# Patient Record
Sex: FEMALE
[De-identification: names, ages, dates, MRNs, and addresses within clinical notes are randomized per-mention and may not be internally consistent; named-entity substitution may affect disease eponyms.]

---

## 2024-07-25 PROBLEM — Z00.00 ENCOUNTER FOR PREVENTIVE HEALTH EXAMINATION: Status: ACTIVE | Noted: 2024-07-25

## 2024-07-26 ENCOUNTER — NON-APPOINTMENT (OUTPATIENT)
Age: 79
End: 2024-07-26

## 2024-07-26 ENCOUNTER — APPOINTMENT (OUTPATIENT)
Dept: HEART AND VASCULAR | Facility: CLINIC | Age: 79
End: 2024-07-26

## 2024-07-26 VITALS
OXYGEN SATURATION: 97 % | SYSTOLIC BLOOD PRESSURE: 152 MMHG | DIASTOLIC BLOOD PRESSURE: 83 MMHG | RESPIRATION RATE: 16 BRPM | WEIGHT: 151 LBS | HEART RATE: 72 BPM | TEMPERATURE: 98.2 F

## 2024-07-26 DIAGNOSIS — Z86.79 PERSONAL HISTORY OF OTHER DISEASES OF THE CIRCULATORY SYSTEM: ICD-10-CM

## 2024-07-26 DIAGNOSIS — I47.19 OTHER SUPRAVENTRICULAR TACHYCARDIA: ICD-10-CM

## 2024-07-26 NOTE — PHYSICAL EXAM
[Normal] : well developed, well nourished, no acute distress [Well Developed] : well developed [Well Nourished] : well nourished [No Acute Distress] : no acute distress [Normal Venous Pressure] : normal venous pressure [No Carotid Bruit] : no carotid bruit [Normal S1, S2] : normal S1, S2 [No Murmur] : no murmur [No Rub] : no rub [No Gallop] : no gallop [Clear Lung Fields] : clear lung fields [Good Air Entry] : good air entry [No Respiratory Distress] : no respiratory distress  [Soft] : abdomen soft [Non Tender] : non-tender [Normal Bowel Sounds] : normal bowel sounds [Normal Gait] : normal gait [No Edema] : no edema [No Cyanosis] : no cyanosis [No Clubbing] : no clubbing [No Varicosities] : no varicosities [No Rash] : no rash [No Skin Lesions] : no skin lesions [Moves all extremities] : moves all extremities [No Focal Deficits] : no focal deficits [Normal Speech] : normal speech [Alert and Oriented] : alert and oriented [Normal memory] : normal memory

## 2024-07-31 PROBLEM — I47.19 ATRIAL TACHYCARDIA: Status: ACTIVE | Noted: 2024-07-26

## 2024-07-31 PROBLEM — Z86.79 HISTORY OF HYPERTENSION: Status: RESOLVED | Noted: 2024-07-26 | Resolved: 2024-07-31

## 2024-07-31 NOTE — ASSESSMENT
[FreeTextEntry1] : We discussed the normal conduction system of the heart and what atrial tachycardia is. We discussed the natural progression of this arrhythmia. Treatment approaches for paroxysmal atrial tachycardia include observation / rate control and ablation. We discussed what an ablation is in detail including risks, benefits and alternatives.    Patient has sustained palpitations with AT documented on holter monitor. Not on oral anticoagulation, on PRN metoprolol tartrate. Discussed how medications will not be effective long term in managing atrial tachycardia.   Discussed risks, benefits and alternatives of ablation procedure. Discussed risks including but not limited to stroke, bleeding, infection, need for major cardiac surgery, death, phrenic nerve palsy/paralysis, occur in less than 1:1000 patients.    Patient expressed understanding will proceed to an ablation in early September 2024. She will withhold BB therapy fo 3 days prior to the procedure. We will obtain result of MCOT monitor completed at Dr. Martinez's [facility].

## 2024-07-31 NOTE — ASSESSMENT
[FreeTextEntry1] : We discussed the normal conduction system of the heart and what atrial tachycardia is. We discussed the natural progression of this arrhythmia. Treatment approaches for paroxysmal atrial tachycardia include observation / rate control and ablation. We discussed what an ablation is in detail including risks, benefits and alternatives.    Patient has sustained palpitations with AT documented on holter monitor. Not on oral anticoagulation, on PRN metoprolol tartrate. Discussed how medications will not be effective long term in managing atrial tachycardia.   Discussed risks, benefits and alternatives of ablation procedure. Discussed risks including but not limited to stroke, bleeding, infection, need for major cardiac surgery, death, phrenic nerve palsy/paralysis, occur in less than 1:1000 patients.    Patient expressed understanding will proceed to an ablation in early September 2024. She will withhold BB therapy fo 3 days prior to the procedure. We will obtain result of MCOT monitor completed at Dr. Martinez's [facility].

## 2024-07-31 NOTE — HISTORY OF PRESENT ILLNESS
[FreeTextEntry1] : Ms. Rivas is a 78 year old female with hypertension, hypothyroid presenting to the office for evaluation of atrial tachycardia.   Patient endorses palpitations a few times a week lasting couple of minutes. Palpitations began a month ago. She states it is usually precipitated by emotional stress. She endorses to associated symptoms of facial flushing, dizziness, hypotension and fatigue. She administers PRN doses of metoprolol tartrate for these episodes.  Review of a screenshot of MCOT from Dr. Martinez's office, shows a paroxysmal episode of Atrial Tachycardia.   Patient is retired. She does not drink or smoke cigarette.    Echocardiogram (June 12, 2024):  LVEF 65% trace MR, no other valvulopathy

## 2024-09-04 ENCOUNTER — INPATIENT (INPATIENT)
Facility: HOSPITAL | Age: 79
LOS: 0 days | Discharge: HOME | End: 2024-09-05
Attending: INTERNAL MEDICINE | Admitting: INTERNAL MEDICINE
Payer: MEDICARE

## 2024-09-04 VITALS
RESPIRATION RATE: 19 BRPM | HEART RATE: 78 BPM | SYSTOLIC BLOOD PRESSURE: 104 MMHG | OXYGEN SATURATION: 97 % | DIASTOLIC BLOOD PRESSURE: 58 MMHG

## 2024-09-04 LAB
ANION GAP SERPL CALC-SCNC: 9 MMOL/L — SIGNIFICANT CHANGE UP (ref 5–17)
APTT BLD: 34.5 SEC — SIGNIFICANT CHANGE UP (ref 24.5–35.6)
BLD GP AB SCN SERPL QL: NEGATIVE — SIGNIFICANT CHANGE UP
BUN SERPL-MCNC: 10 MG/DL — SIGNIFICANT CHANGE UP (ref 7–23)
CALCIUM SERPL-MCNC: 9.4 MG/DL — SIGNIFICANT CHANGE UP (ref 8.4–10.5)
CHLORIDE SERPL-SCNC: 104 MMOL/L — SIGNIFICANT CHANGE UP (ref 96–108)
CO2 SERPL-SCNC: 25 MMOL/L — SIGNIFICANT CHANGE UP (ref 22–31)
CREAT SERPL-MCNC: 0.72 MG/DL — SIGNIFICANT CHANGE UP (ref 0.5–1.3)
EGFR: 86 ML/MIN/1.73M2 — SIGNIFICANT CHANGE UP
GLUCOSE SERPL-MCNC: 102 MG/DL — HIGH (ref 70–99)
HCT VFR BLD CALC: 36.9 % — SIGNIFICANT CHANGE UP (ref 34.5–45)
HGB BLD-MCNC: 12 G/DL — SIGNIFICANT CHANGE UP (ref 11.5–15.5)
INR BLD: 0.92 — SIGNIFICANT CHANGE UP (ref 0.85–1.18)
ISTAT INR: 1 — SIGNIFICANT CHANGE UP (ref 0.88–1.16)
ISTAT PT: 10.4 SEC — SIGNIFICANT CHANGE UP (ref 10–12.9)
ISTAT VENOUS BE: 1 MMOL/L — SIGNIFICANT CHANGE UP (ref -2–3)
ISTAT VENOUS GLUCOSE: 101 MG/DL — HIGH (ref 70–99)
ISTAT VENOUS HCO3: 26 MMOL/L — SIGNIFICANT CHANGE UP (ref 23–28)
ISTAT VENOUS HEMATOCRIT: 37 % — SIGNIFICANT CHANGE UP (ref 34.5–45)
ISTAT VENOUS HEMOGLOBIN: 12.6 GM/DL — SIGNIFICANT CHANGE UP (ref 11.5–15.5)
ISTAT VENOUS IONIZED CALCIUM: 1.2 MMOL/L — SIGNIFICANT CHANGE UP (ref 1.12–1.3)
ISTAT VENOUS PCO2: 42 MMHG — SIGNIFICANT CHANGE UP (ref 41–51)
ISTAT VENOUS PH: 7.39 — SIGNIFICANT CHANGE UP (ref 7.31–7.41)
ISTAT VENOUS PO2: <66 MMHG — LOW (ref 35–40)
ISTAT VENOUS POTASSIUM: 4.2 MMOL/L — SIGNIFICANT CHANGE UP (ref 3.5–5.3)
ISTAT VENOUS SO2: 20 % — SIGNIFICANT CHANGE UP
ISTAT VENOUS SODIUM: 140 MMOL/L — SIGNIFICANT CHANGE UP (ref 135–145)
ISTAT VENOUS TCO2: 27 MMOL/L — SIGNIFICANT CHANGE UP (ref 22–31)
MAGNESIUM SERPL-MCNC: 1.9 MG/DL — SIGNIFICANT CHANGE UP (ref 1.6–2.6)
MCHC RBC-ENTMCNC: 30.2 PG — SIGNIFICANT CHANGE UP (ref 27–34)
MCHC RBC-ENTMCNC: 32.5 GM/DL — SIGNIFICANT CHANGE UP (ref 32–36)
MCV RBC AUTO: 92.7 FL — SIGNIFICANT CHANGE UP (ref 80–100)
NRBC # BLD: 0 /100 WBCS — SIGNIFICANT CHANGE UP (ref 0–0)
PLATELET # BLD AUTO: 301 K/UL — SIGNIFICANT CHANGE UP (ref 150–400)
POCT ISTAT CREATININE: 0.5 MG/DL — SIGNIFICANT CHANGE UP (ref 0.5–1.3)
POTASSIUM SERPL-MCNC: 3.9 MMOL/L — SIGNIFICANT CHANGE UP (ref 3.5–5.3)
POTASSIUM SERPL-SCNC: 3.9 MMOL/L — SIGNIFICANT CHANGE UP (ref 3.5–5.3)
PROTHROM AB SERPL-ACNC: 10.5 SEC — SIGNIFICANT CHANGE UP (ref 9.5–13)
RBC # BLD: 3.98 M/UL — SIGNIFICANT CHANGE UP (ref 3.8–5.2)
RBC # FLD: 14.2 % — SIGNIFICANT CHANGE UP (ref 10.3–14.5)
RH IG SCN BLD-IMP: POSITIVE — SIGNIFICANT CHANGE UP
SODIUM SERPL-SCNC: 138 MMOL/L — SIGNIFICANT CHANGE UP (ref 135–145)
WBC # BLD: 11.16 K/UL — HIGH (ref 3.8–10.5)
WBC # FLD AUTO: 11.16 K/UL — HIGH (ref 3.8–10.5)

## 2024-09-04 PROCEDURE — 99223 1ST HOSP IP/OBS HIGH 75: CPT | Mod: 25

## 2024-09-04 PROCEDURE — 93623 PRGRMD STIMJ&PACG IV RX NFS: CPT | Mod: 26

## 2024-09-04 PROCEDURE — 93653 COMPRE EP EVAL TX SVT: CPT

## 2024-09-04 RX ORDER — SULFASALAZINE 500 MG/1
2 TABLET, DELAYED RELEASE ORAL
Refills: 0 | DISCHARGE

## 2024-09-04 RX ORDER — ROSUVASTATIN CALCIUM 10 MG/1
1 TABLET ORAL
Refills: 0 | DISCHARGE

## 2024-09-04 RX ORDER — ROSUVASTATIN CALCIUM 10 MG/1
5 TABLET ORAL AT BEDTIME
Refills: 0 | Status: DISCONTINUED | OUTPATIENT
Start: 2024-09-04 | End: 2024-09-05

## 2024-09-04 RX ORDER — ENALAPRIL MALEATE 5 MG/1
2.5 TABLET ORAL DAILY
Refills: 0 | Status: DISCONTINUED | OUTPATIENT
Start: 2024-09-04 | End: 2024-09-05

## 2024-09-04 RX ORDER — LEVOTHYROXINE SODIUM 100 MCG
25 TABLET ORAL DAILY
Refills: 0 | Status: DISCONTINUED | OUTPATIENT
Start: 2024-09-04 | End: 2024-09-05

## 2024-09-04 RX ORDER — ACETAMINOPHEN 325 MG/1
650 TABLET ORAL EVERY 6 HOURS
Refills: 0 | Status: DISCONTINUED | OUTPATIENT
Start: 2024-09-04 | End: 2024-09-05

## 2024-09-04 RX ORDER — ENALAPRIL MALEATE 5 MG/1
1 TABLET ORAL
Refills: 0 | DISCHARGE

## 2024-09-04 RX ADMIN — ACETAMINOPHEN 650 MILLIGRAM(S): 325 TABLET ORAL at 23:16

## 2024-09-04 RX ADMIN — ROSUVASTATIN CALCIUM 5 MILLIGRAM(S): 10 TABLET ORAL at 22:57

## 2024-09-04 NOTE — PRE-ANESTHESIA EVALUATION ADULT - NSANTHOSAYNRD_GEN_A_CORE
No. [place] screening performed.  STOP BANG Legend: 0-2 = LOW Risk; 3-4 = INTERMEDIATE Risk; 5-8 = HIGH Risk No

## 2024-09-04 NOTE — H&P ADULT - ASSESSMENT
Ms. Yo is a 78 year old female with hypertension, hypothyroid presenting to the EP lab for EPS +/- AT ablation. Patient endorses palpitations a few times a week lasting couple of minutes. Palpitations began a month ago. She states it is usually precipitated by emotional stress. She endorses to associated symptoms of facial flushing, dizziness, hypotension and fatigue. She administers PRN doses of metoprolol tartrate for these episodes.     Review of a screenshot of MCOT from Dr. Mcwilliams's office, shows a paroxysmal episode of Atrial Tachycardia.

## 2024-09-04 NOTE — H&P ADULT - HISTORY OF PRESENT ILLNESS
Ms. Yo is a 78 year old female with hypertension, hypothyroid presenting to the EP lab for EPS +/- AT ablation. Patient endorses palpitations a few times a week lasting couple of minutes. Palpitations began a month ago. She states it is usually precipitated by emotional stress. She endorses to associated symptoms of facial flushing, dizziness, hypotension and fatigue. She administers PRN doses of metoprolol tartrate for these episodes.   Review of a screenshot of MCOT from Dr. Mcwilliams's office, shows a paroxysmal episode of Atrial Tachycardia. Patient is retired. She does not drink or smoke cigarette.   ?  Echocardiogram (June 12, 2024):  LVEF 65%  trace MR, no other valvulopathy

## 2024-09-04 NOTE — PATIENT PROFILE ADULT - FALL HARM RISK - HARM RISK INTERVENTIONS

## 2024-09-05 ENCOUNTER — TRANSCRIPTION ENCOUNTER (OUTPATIENT)
Age: 79
End: 2024-09-05

## 2024-09-05 VITALS
RESPIRATION RATE: 16 BRPM | OXYGEN SATURATION: 96 % | DIASTOLIC BLOOD PRESSURE: 56 MMHG | SYSTOLIC BLOOD PRESSURE: 115 MMHG | TEMPERATURE: 98 F | HEART RATE: 73 BPM

## 2024-09-05 PROCEDURE — C1894: CPT

## 2024-09-05 PROCEDURE — 85014 HEMATOCRIT: CPT

## 2024-09-05 PROCEDURE — 86901 BLOOD TYPING SEROLOGIC RH(D): CPT

## 2024-09-05 PROCEDURE — 82947 ASSAY GLUCOSE BLOOD QUANT: CPT

## 2024-09-05 PROCEDURE — 99239 HOSP IP/OBS DSCHRG MGMT >30: CPT

## 2024-09-05 PROCEDURE — 86900 BLOOD TYPING SEROLOGIC ABO: CPT

## 2024-09-05 PROCEDURE — 36415 COLL VENOUS BLD VENIPUNCTURE: CPT

## 2024-09-05 PROCEDURE — C1730: CPT

## 2024-09-05 PROCEDURE — 84295 ASSAY OF SERUM SODIUM: CPT

## 2024-09-05 PROCEDURE — 85730 THROMBOPLASTIN TIME PARTIAL: CPT

## 2024-09-05 PROCEDURE — 85027 COMPLETE CBC AUTOMATED: CPT

## 2024-09-05 PROCEDURE — 93010 ELECTROCARDIOGRAM REPORT: CPT

## 2024-09-05 PROCEDURE — 82330 ASSAY OF CALCIUM: CPT

## 2024-09-05 PROCEDURE — 85610 PROTHROMBIN TIME: CPT

## 2024-09-05 PROCEDURE — 82565 ASSAY OF CREATININE: CPT

## 2024-09-05 PROCEDURE — C1732: CPT

## 2024-09-05 PROCEDURE — 80048 BASIC METABOLIC PNL TOTAL CA: CPT

## 2024-09-05 PROCEDURE — 83735 ASSAY OF MAGNESIUM: CPT

## 2024-09-05 PROCEDURE — 93005 ELECTROCARDIOGRAM TRACING: CPT

## 2024-09-05 PROCEDURE — 86850 RBC ANTIBODY SCREEN: CPT

## 2024-09-05 PROCEDURE — C1760: CPT

## 2024-09-05 PROCEDURE — 84132 ASSAY OF SERUM POTASSIUM: CPT

## 2024-09-05 PROCEDURE — C1766: CPT

## 2024-09-05 PROCEDURE — 82803 BLOOD GASES ANY COMBINATION: CPT

## 2024-09-05 RX ORDER — ACETAMINOPHEN 325 MG/1
2 TABLET ORAL
Qty: 0 | Refills: 0 | DISCHARGE
Start: 2024-09-05

## 2024-09-05 RX ORDER — LEVOTHYROXINE SODIUM 100 MCG
1 TABLET ORAL
Qty: 0 | Refills: 0 | DISCHARGE

## 2024-09-05 RX ADMIN — ACETAMINOPHEN 650 MILLIGRAM(S): 325 TABLET ORAL at 00:14

## 2024-09-05 RX ADMIN — Medication 25 MICROGRAM(S): at 04:12

## 2024-09-05 RX ADMIN — ENALAPRIL MALEATE 2.5 MILLIGRAM(S): 5 TABLET ORAL at 05:44

## 2024-09-05 NOTE — DISCHARGE NOTE PROVIDER - HOSPITAL COURSE
Ms. Yo is a 78 year old female with hypertension, hypothyroid who presented to the EP lab for EPS +/- AT ablation. Patient endorses palpitations a few times a week lasting couple of minutes. Palpitations began a month ago. She states it is usually precipitated by emotional stress. She endorses to associated symptoms of facial flushing, dizziness, hypotension and fatigue. She administers PRN doses of metoprolol tartrate for these episodes.     She is now s/p EPS/ablation of AT originating from the HIS. Of note her SULEMAN post procedure increased from 200ms to 300ms. No evidence of second or high degree AVB on telemetry overnight. She feels well. She denies post-procedure palpitations, c/p, sob, lightheadedness, ELLEN or syncope. Groin dressings removed to reveal no bleeding or hematoma. She has ambulated and urinated without issue post-procedure. She is planned for d/c home.        ?

## 2024-09-05 NOTE — DISCHARGE NOTE PROVIDER - NSDCMRMEDTOKEN_GEN_ALL_CORE_FT
acetaminophen 325 mg oral tablet: 2 tab(s) orally every 6 hours As needed Moderate Pain (1 -3)  enalapril 2.5 mg oral tablet: 1 tab(s) orally once a day  levothyroxine 25 mcg (0.025 mg) oral tablet: 1 tab(s) orally once a day  rosuvastatin 5 mg oral tablet: 1 tab(s) orally once a day (at bedtime)  sulfaSALAzine 500 mg oral tablet: 2 tab(s) orally 2 times a day

## 2024-09-05 NOTE — DISCHARGE NOTE PROVIDER - NSDCCPCAREPLAN_GEN_ALL_CORE_FT
PRINCIPAL DISCHARGE DIAGNOSIS  Diagnosis: Atrial tachycardia  Assessment and Plan of Treatment: You had an ablation of your atrial tachycardia substrate. To do this, catheters were placed in your femoral veins (right and left groins). It is important to allow one week for this area to fully heal. For the next week avoid strenuous activity, squatting, straining, sex, and lifting more than 5lbs. You may shower - allow the soap and water to run over the area and then pat dry. Do not swim or tub bath for one week. Keep the area clean and dry. Do not place creams or bandages to the site.   Please follow-up with Dr. Cormier on October 15 at 1pm.

## 2024-09-05 NOTE — DISCHARGE NOTE NURSING/CASE MANAGEMENT/SOCIAL WORK - PATIENT PORTAL LINK FT
You can access the FollowMyHealth Patient Portal offered by [facility] by registering at the following website: [email or web address]. By joining [facility]’s FollowMyHealth portal, you will also be able to view your health information using other applications (apps) compatible with our system.

## 2024-09-05 NOTE — DISCHARGE NOTE NURSING/CASE MANAGEMENT/SOCIAL WORK - NSDCPEFALRISK_GEN_ALL_CORE
For information on Fall & Injury Prevention, visit: [email or web address] OR  [email or web address] OR  https://www.cdc.gov/steadi/patient.html

## 2024-09-05 NOTE — DISCHARGE NOTE PROVIDER - CARE PROVIDER_API CALL
Vipul Cormier  Cardiac Electrophysiology  [address], [address] [place] [address]  Phone: [phone]  Fax: [phone]  Scheduled Appointment: 10/15/2024 01:00 PM

## 2024-09-08 PROBLEM — I47.19 OTHER SUPRAVENTRICULAR TACHYCARDIA: Chronic | Status: ACTIVE | Noted: 2024-09-04

## 2024-09-08 PROBLEM — R00.2 PALPITATIONS: Chronic | Status: ACTIVE | Noted: 2024-09-04

## 2024-09-11 DIAGNOSIS — I47.10 SUPRAVENTRICULAR TACHYCARDIA, UNSPECIFIED: ICD-10-CM

## 2024-09-11 DIAGNOSIS — I10 ESSENTIAL (PRIMARY) HYPERTENSION: ICD-10-CM

## 2024-09-11 DIAGNOSIS — Z79.890 HORMONE REPLACEMENT THERAPY: ICD-10-CM

## 2024-09-11 DIAGNOSIS — E03.9 HYPOTHYROIDISM, UNSPECIFIED: ICD-10-CM

## 2024-10-15 ENCOUNTER — NON-APPOINTMENT (OUTPATIENT)
Age: 79
End: 2024-10-15

## 2024-10-15 ENCOUNTER — APPOINTMENT (OUTPATIENT)
Dept: HEART AND VASCULAR | Facility: CLINIC | Age: 79
End: 2024-10-15
Payer: MEDICARE

## 2024-10-15 VITALS — DIASTOLIC BLOOD PRESSURE: 70 MMHG | SYSTOLIC BLOOD PRESSURE: 156 MMHG

## 2024-10-15 VITALS
SYSTOLIC BLOOD PRESSURE: 171 MMHG | HEIGHT: 62 IN | WEIGHT: 147 LBS | BODY MASS INDEX: 27.05 KG/M2 | OXYGEN SATURATION: 96 % | TEMPERATURE: 95.9 F | DIASTOLIC BLOOD PRESSURE: 71 MMHG | HEART RATE: 68 BPM

## 2024-10-15 DIAGNOSIS — I47.19 OTHER SUPRAVENTRICULAR TACHYCARDIA: ICD-10-CM

## 2024-10-15 PROCEDURE — 93000 ELECTROCARDIOGRAM COMPLETE: CPT

## 2024-10-15 PROCEDURE — 99214 OFFICE O/P EST MOD 30 MIN: CPT | Mod: 25

## 2025-09-02 ENCOUNTER — NON-APPOINTMENT (OUTPATIENT)
Age: 80
End: 2025-09-02

## 2025-09-02 DIAGNOSIS — E66.3 OVERWEIGHT: ICD-10-CM

## 2025-09-02 DIAGNOSIS — I95.2 HYPOTENSION DUE TO DRUGS: ICD-10-CM

## 2025-09-02 DIAGNOSIS — Z91.81 HISTORY OF FALLING: ICD-10-CM

## 2025-09-02 DIAGNOSIS — R00.0 TACHYCARDIA, UNSPECIFIED: ICD-10-CM

## 2025-09-02 DIAGNOSIS — M54.50 LOW BACK PAIN, UNSPECIFIED: ICD-10-CM

## 2025-09-02 DIAGNOSIS — R09.89 OTHER SPECIFIED SYMPTOMS AND SIGNS INVOLVING THE CIRCULATORY AND RESPIRATORY SYSTEMS: ICD-10-CM

## 2025-09-02 DIAGNOSIS — E03.9 HYPOTHYROIDISM, UNSPECIFIED: ICD-10-CM

## 2025-09-02 DIAGNOSIS — E78.00 PURE HYPERCHOLESTEROLEMIA, UNSPECIFIED: ICD-10-CM

## 2025-09-02 DIAGNOSIS — M19.90 UNSPECIFIED OSTEOARTHRITIS, UNSPECIFIED SITE: ICD-10-CM

## 2025-09-02 DIAGNOSIS — R03.0 ELEVATED BLOOD-PRESSURE READING, W/OUT DIAGNOSIS OF HYPERTENSION: ICD-10-CM

## 2025-09-02 DIAGNOSIS — Z78.9 OTHER SPECIFIED HEALTH STATUS: ICD-10-CM

## 2025-09-02 DIAGNOSIS — R00.2 PALPITATIONS: ICD-10-CM

## 2025-09-02 DIAGNOSIS — R42 DIZZINESS AND GIDDINESS: ICD-10-CM

## 2025-09-02 DIAGNOSIS — Z91.89 OTHER SPECIFIED PERSONAL RISK FACTORS, NOT ELSEWHERE CLASSIFIED: ICD-10-CM

## 2025-09-02 RX ORDER — ROSUVASTATIN CALCIUM 5 MG/1
5 TABLET, FILM COATED ORAL
Refills: 0 | Status: ACTIVE | COMMUNITY

## 2025-09-02 RX ORDER — LEVOTHYROXINE SODIUM 0.03 MG/1
25 TABLET ORAL
Refills: 0 | Status: ACTIVE | COMMUNITY

## 2025-09-02 RX ORDER — METOPROLOL TARTRATE 25 MG/1
25 TABLET ORAL
Refills: 0 | Status: ACTIVE | COMMUNITY

## 2025-09-02 RX ORDER — ENALAPRIL MALEATE 2.5 MG/1
2.5 TABLET ORAL
Refills: 0 | Status: ACTIVE | COMMUNITY